# Patient Record
Sex: FEMALE | Race: BLACK OR AFRICAN AMERICAN
[De-identification: names, ages, dates, MRNs, and addresses within clinical notes are randomized per-mention and may not be internally consistent; named-entity substitution may affect disease eponyms.]

---

## 2019-06-24 ENCOUNTER — APPOINTMENT (OUTPATIENT)
Dept: INTERNAL MEDICINE | Facility: CLINIC | Age: 54
End: 2019-06-24
Payer: COMMERCIAL

## 2019-06-24 VITALS
OXYGEN SATURATION: 100 % | TEMPERATURE: 98.3 F | HEIGHT: 60 IN | WEIGHT: 147 LBS | HEART RATE: 52 BPM | BODY MASS INDEX: 28.86 KG/M2

## 2019-06-24 VITALS — DIASTOLIC BLOOD PRESSURE: 70 MMHG | SYSTOLIC BLOOD PRESSURE: 130 MMHG

## 2019-06-24 DIAGNOSIS — Z83.42 FAMILY HISTORY OF FAMILIAL HYPERCHOLESTEROLEMIA: ICD-10-CM

## 2019-06-24 DIAGNOSIS — Z82.49 FAMILY HISTORY OF ISCHEMIC HEART DISEASE AND OTHER DISEASES OF THE CIRCULATORY SYSTEM: ICD-10-CM

## 2019-06-24 DIAGNOSIS — H43.392 OTHER VITREOUS OPACITIES, LEFT EYE: ICD-10-CM

## 2019-06-24 DIAGNOSIS — Z87.898 PERSONAL HISTORY OF OTHER SPECIFIED CONDITIONS: ICD-10-CM

## 2019-06-24 DIAGNOSIS — Z83.3 FAMILY HISTORY OF DIABETES MELLITUS: ICD-10-CM

## 2019-06-24 DIAGNOSIS — Z78.9 OTHER SPECIFIED HEALTH STATUS: ICD-10-CM

## 2019-06-24 PROCEDURE — 36415 COLL VENOUS BLD VENIPUNCTURE: CPT

## 2019-06-24 PROCEDURE — 99386 PREV VISIT NEW AGE 40-64: CPT | Mod: 25

## 2019-06-24 PROCEDURE — 93000 ELECTROCARDIOGRAM COMPLETE: CPT

## 2019-06-24 NOTE — REVIEW OF SYSTEMS
[Negative] : Heme/Lymph [FreeTextEntry3] : has a floater left eye [FreeTextEntry5] : has palpitations stressed,  palps come and go [FreeTextEntry7] : did not colonoscopy/ constip occ [de-identified] : no headaches [de-identified] : stressed, sleeps well. sleepy, not crying [FreeTextEntry1] : LMP in FEB 2019,reg til then, feels as if might have a period, last mammogram 2 years ago./ had a fibroid and was anemic

## 2019-06-24 NOTE — HISTORY OF PRESENT ILLNESS
[FreeTextEntry1] : here as a new patient [de-identified] : has lost 5 lbs, working in a [employer] [facility]

## 2019-06-24 NOTE — ASSESSMENT
[FreeTextEntry1] : at risk for diabetes: watch weight, check labs\par floater: f/u opthal\par perimenoause\par palpitations : echo and halter\par

## 2019-06-24 NOTE — HEALTH RISK ASSESSMENT
[Good] : ~his/her~  mood as  good [Monthly or less (1 pt)] : Monthly or less (1 point) [1 or 2 (0 pts)] : 1 or 2 (0 points) [Never (0 pts)] : Never (0 points) [No falls in past year] : Patient reported no falls in the past year [0] : 2) Feeling down, depressed, or hopeless: Not at all (0) [Patient reported PAP Smear was normal] : Patient reported PAP Smear was normal [HIV test declined] : HIV test declined [Hepatitis C test offered] : Hepatitis C test offered [With Family] : lives with family [Employed] : employed [] :  [Feels Safe at Home] : Feels safe at home [Smoke Detector] : smoke detector [Carbon Monoxide Detector] : carbon monoxide detector [Seat Belt] :  uses seat belt [Sunscreen] : uses sunscreen [] : No [de-identified] : None [Audit-CScore] : 1 [de-identified] : Walking [[id number]] : 0 [Reports changes in hearing] : Reports no changes in hearing [Reports changes in vision] : Reports no changes in vision [Reports changes in dental health] : Reports no changes in dental health [MammogramDate] : 01/17 [PapSmearDate] : 01/17

## 2019-06-24 NOTE — PHYSICAL EXAM
[No Acute Distress] : no acute distress [Well Nourished] : well nourished [Well Developed] : well developed [Well-Appearing] : well-appearing [EOMI] : extraocular movements intact [PERRL] : pupils equal round and reactive to light [Normal Sclera/Conjunctiva] : normal sclera/conjunctiva [Normal Outer Ear/Nose] : the outer ears and nose were normal in appearance [Supple] : supple [Normal Oropharynx] : the oropharynx was normal [No JVD] : no jugular venous distention [No Respiratory Distress] : no respiratory distress  [Thyroid Normal, No Nodules] : the thyroid was normal and there were no nodules present [No Lymphadenopathy] : no lymphadenopathy [Normal Rate] : normal rate  [Clear to Auscultation] : lungs were clear to auscultation bilaterally [No Accessory Muscle Use] : no accessory muscle use [Regular Rhythm] : with a regular rhythm [Normal S1, S2] : normal S1 and S2 [No Abdominal Bruit] : a ~M bruit was not heard ~T in the abdomen [No Murmur] : no murmur heard [No Carotid Bruits] : no carotid bruits [Pedal Pulses Present] : the pedal pulses are present [No Varicosities] : no varicosities [No Edema] : there was no peripheral edema [No Extremity Clubbing/Cyanosis] : no extremity clubbing/cyanosis [Soft] : abdomen soft [Non Tender] : non-tender [No Palpable Aorta] : no palpable aorta [No Masses] : no abdominal mass palpated [Non-distended] : non-distended [No HSM] : no HSM [Normal Anterior Cervical Nodes] : no anterior cervical lymphadenopathy [Normal Posterior Cervical Nodes] : no posterior cervical lymphadenopathy [Normal Bowel Sounds] : normal bowel sounds [No CVA Tenderness] : no CVA  tenderness [No Spinal Tenderness] : no spinal tenderness [Grossly Normal Strength/Tone] : grossly normal strength/tone [No Joint Swelling] : no joint swelling [No Rash] : no rash [No Focal Deficits] : no focal deficits [Normal Gait] : normal gait [Coordination Grossly Intact] : coordination grossly intact [Normal Affect] : the affect was normal [Deep Tendon Reflexes (DTR)] : deep tendon reflexes were 2+ and symmetric [Normal Insight/Judgement] : insight and judgment were intact [de-identified] : breast no masses scar on right breast and 2 on left,  [de-identified] : fibroid noted on the left [FreeTextEntry1] : def [de-identified] : def

## 2019-07-19 ENCOUNTER — CLINICAL ADVICE (OUTPATIENT)
Age: 54
End: 2019-07-19

## 2019-10-25 ENCOUNTER — NON-APPOINTMENT (OUTPATIENT)
Age: 54
End: 2019-10-25

## 2019-10-25 ENCOUNTER — APPOINTMENT (OUTPATIENT)
Dept: CARDIOLOGY | Facility: CLINIC | Age: 54
End: 2019-10-25
Payer: COMMERCIAL

## 2019-10-25 VITALS — WEIGHT: 146 LBS | BODY MASS INDEX: 28.66 KG/M2 | HEIGHT: 60 IN

## 2019-10-25 VITALS — DIASTOLIC BLOOD PRESSURE: 60 MMHG | SYSTOLIC BLOOD PRESSURE: 130 MMHG

## 2019-10-25 DIAGNOSIS — Z78.9 OTHER SPECIFIED HEALTH STATUS: ICD-10-CM

## 2019-10-25 DIAGNOSIS — Z86.39 PERSONAL HISTORY OF OTHER ENDOCRINE, NUTRITIONAL AND METABOLIC DISEASE: ICD-10-CM

## 2019-10-25 DIAGNOSIS — D25.9 LEIOMYOMA OF UTERUS, UNSPECIFIED: ICD-10-CM

## 2019-10-25 PROCEDURE — 93000 ELECTROCARDIOGRAM COMPLETE: CPT

## 2019-10-25 PROCEDURE — 99204 OFFICE O/P NEW MOD 45 MIN: CPT

## 2019-10-25 NOTE — REASON FOR VISIT
[Initial Evaluation] : an initial evaluation of [Hyperlipidemia] : hyperlipidemia [Palpitations] : palpitations

## 2019-10-28 NOTE — HISTORY OF PRESENT ILLNESS
[FreeTextEntry1] : 53 year old  lady was referred by Dr. Tabor because of palpitation.\par \par She started having intermittent palpitations a few years ago. She was being followed by Dr. Tabor in [place].  After Dr. Tabor left that practice,  the patient could not find her new location and therefore did not see any doctor for the last 2 years.  The severity and nature of palpitation has not changed much during this time.  Because of her age she felt that it was time for her to see a cardiologist for the palpitation and hence sought a consult.\par \par She feels a racing of heartbeat for a few seconds.  It does not last long.  There are no associated symptoms.  She has no symptoms on physical exertion.  She is not on any medications for palpitation.\par \par She has history of hyperthyroidism in the past and took some medication for 4-5 years.  She has gradually tapered off that medication.  At that time she was losing weight.

## 2019-10-28 NOTE — PHYSICAL EXAM
[General Appearance - Well Developed] : well developed [Well Groomed] : well groomed [General Appearance - Well Nourished] : well nourished [Normal Appearance] : normal appearance [Normal Conjunctiva] : the conjunctiva exhibited no abnormalities [General Appearance - In No Acute Distress] : no acute distress [No Deformities] : no deformities [Normal Oral Mucosa] : normal oral mucosa [Eyelids - No Xanthelasma] : the eyelids demonstrated no xanthelasmas [No Oral Pallor] : no oral pallor [No Oral Cyanosis] : no oral cyanosis [Normal Jugular Venous V Waves Present] : normal jugular venous V waves present [Normal Jugular Venous A Waves Present] : normal jugular venous A waves present [No Jugular Venous Mcneil A Waves] : no jugular venous mcneil A waves [Heart Sounds] : normal S1 and S2 [Heart Rate And Rhythm] : heart rate and rhythm were normal [Murmurs] : no murmurs present [Respiration, Rhythm And Depth] : normal respiratory rhythm and effort [Exaggerated Use Of Accessory Muscles For Inspiration] : no accessory muscle use [Abdomen Soft] : soft [Auscultation Breath Sounds / Voice Sounds] : lungs were clear to auscultation bilaterally [Abdomen Tenderness] : non-tender [Abdomen Mass (___ Cm)] : no abdominal mass palpated [Abnormal Walk] : normal gait [Gait - Sufficient For Exercise Testing] : the gait was sufficient for exercise testing [Nail Clubbing] : no clubbing of the fingernails [Cyanosis, Localized] : no localized cyanosis [Petechial Hemorrhages (___cm)] : no petechial hemorrhages [Skin Color & Pigmentation] : normal skin color and pigmentation [No Venous Stasis] : no venous stasis [] : no rash [No Skin Ulcers] : no skin ulcer [Skin Lesions] : no skin lesions [No Xanthoma] : no  xanthoma was observed [Affect] : the affect was normal [Oriented To Time, Place, And Person] : oriented to person, place, and time [Mood] : the mood was normal [No Anxiety] : not feeling anxious [FreeTextEntry1] : minimal Thyroid enlargement

## 2019-10-28 NOTE — DISCUSSION/SUMMARY
[FreeTextEntry1] : I did not make any changes in her treatment.  However I set her up for an echocardiogram and telemetry monitor.  Based upon the results further recommendations will follow.  I have asked her to try and obtain copies of her history regarding hyperthyroidism from her prior endocrinologist.\par \par ADDENDUM: \par \par At the end of the examination casually she mentioned that sometimes she gets left-sided chest tightness which occurs randomly and may last up to 15 minutes.  She has not noticed any particular association with exertion etc.  It lasts for several minutes and goes away spontaneously.  There are no associated symptoms.  I told her to see if it is related to inspiration, any body movements or physical exertion.  I also told her to see if it is reproducible.  This information will be helpful.  I did not do anything about it at this time.  If it continues to occur or gets worse then may be a stress test might be in order.

## 2019-10-28 NOTE — ASSESSMENT
[FreeTextEntry1] : Physical exam is unremarkable.  She does have a mild diffuse goiter.  Palpitations seems to be fairly benign and does not occur every day.  On some occasions, after the palpitations, she feels "out of it" for a brief period.

## 2019-12-06 ENCOUNTER — APPOINTMENT (OUTPATIENT)
Dept: CARDIOLOGY | Facility: CLINIC | Age: 54
End: 2019-12-06
Payer: COMMERCIAL

## 2019-12-06 PROCEDURE — 93306 TTE W/DOPPLER COMPLETE: CPT

## 2019-12-29 ENCOUNTER — TRANSCRIPTION ENCOUNTER (OUTPATIENT)
Age: 54
End: 2019-12-29

## 2020-01-07 ENCOUNTER — APPOINTMENT (OUTPATIENT)
Dept: CARDIOLOGY | Facility: CLINIC | Age: 55
End: 2020-01-07
Payer: COMMERCIAL

## 2020-01-07 VITALS — SYSTOLIC BLOOD PRESSURE: 126 MMHG | DIASTOLIC BLOOD PRESSURE: 80 MMHG

## 2020-01-07 VITALS — OXYGEN SATURATION: 98 % | WEIGHT: 143 LBS | HEART RATE: 76 BPM | BODY MASS INDEX: 27.93 KG/M2

## 2020-01-07 DIAGNOSIS — R92.8 OTHER ABNORMAL AND INCONCLUSIVE FINDINGS ON DIAGNOSTIC IMAGING OF BREAST: ICD-10-CM

## 2020-01-07 PROCEDURE — 99214 OFFICE O/P EST MOD 30 MIN: CPT

## 2020-01-07 NOTE — HISTORY OF PRESENT ILLNESS
[FreeTextEntry1] : She continues to get intermittent palpitations.  After the telemetry monitor and echo were done attempts were made to contact her.  However it was not possible because the telephone numbers listed were wrong numbers.  Messages were left but the person never called back.\par \par Telemetry monitor showed sinus rhythm with occasional VPCs, sinus bradycardia,Nonsustained PAT, sinus tachycardia,\par \par Echocardiogram showed normal left and right ventricular size and systolic function with trace of mitral and mild tricuspid regurgitation.

## 2020-01-07 NOTE — PHYSICAL EXAM
[General Appearance - Well Developed] : well developed [Normal Appearance] : normal appearance [Well Groomed] : well groomed [General Appearance - Well Nourished] : well nourished [No Deformities] : no deformities [General Appearance - In No Acute Distress] : no acute distress [Normal Conjunctiva] : the conjunctiva exhibited no abnormalities [Eyelids - No Xanthelasma] : the eyelids demonstrated no xanthelasmas [Normal Oral Mucosa] : normal oral mucosa [No Oral Pallor] : no oral pallor [No Oral Cyanosis] : no oral cyanosis [Normal Jugular Venous A Waves Present] : normal jugular venous A waves present [Normal Jugular Venous V Waves Present] : normal jugular venous V waves present [No Jugular Venous Mcneil A Waves] : no jugular venous mcneil A waves [FreeTextEntry1] : minimal Thyroid enlargement [Respiration, Rhythm And Depth] : normal respiratory rhythm and effort [Auscultation Breath Sounds / Voice Sounds] : lungs were clear to auscultation bilaterally [Exaggerated Use Of Accessory Muscles For Inspiration] : no accessory muscle use [Heart Rate And Rhythm] : heart rate and rhythm were normal [Heart Sounds] : normal S1 and S2 [Murmurs] : no murmurs present [Abdomen Soft] : soft [Abdomen Tenderness] : non-tender [Abdomen Mass (___ Cm)] : no abdominal mass palpated [Abnormal Walk] : normal gait [Gait - Sufficient For Exercise Testing] : the gait was sufficient for exercise testing [Nail Clubbing] : no clubbing of the fingernails [Cyanosis, Localized] : no localized cyanosis [Petechial Hemorrhages (___cm)] : no petechial hemorrhages [Skin Color & Pigmentation] : normal skin color and pigmentation [] : no rash [No Venous Stasis] : no venous stasis [Skin Lesions] : no skin lesions [No Skin Ulcers] : no skin ulcer [No Xanthoma] : no  xanthoma was observed [Affect] : the affect was normal [Oriented To Time, Place, And Person] : oriented to person, place, and time [Mood] : the mood was normal [No Anxiety] : not feeling anxious

## 2020-01-07 NOTE — DISCUSSION/SUMMARY
[FreeTextEntry1] : I have ordered metoprolol succinate 25 mg daily and based upon the response further recommendations will follow.

## 2020-02-12 ENCOUNTER — APPOINTMENT (OUTPATIENT)
Dept: CARDIOLOGY | Facility: CLINIC | Age: 55
End: 2020-02-12
Payer: COMMERCIAL

## 2020-02-12 ENCOUNTER — NON-APPOINTMENT (OUTPATIENT)
Age: 55
End: 2020-02-12

## 2020-02-12 VITALS — HEART RATE: 75 BPM | BODY MASS INDEX: 27.93 KG/M2 | WEIGHT: 143 LBS | OXYGEN SATURATION: 99 %

## 2020-02-12 VITALS — DIASTOLIC BLOOD PRESSURE: 80 MMHG | SYSTOLIC BLOOD PRESSURE: 120 MMHG

## 2020-02-12 PROCEDURE — 99214 OFFICE O/P EST MOD 30 MIN: CPT | Mod: 25

## 2020-02-12 PROCEDURE — 93000 ELECTROCARDIOGRAM COMPLETE: CPT

## 2020-02-12 NOTE — HISTORY OF PRESENT ILLNESS
[FreeTextEntry1] : She denies any chest pain or dizziness.  Palpitation is substantially decreased on the medication.  She gets worried sometimes if her weight goes to 60.  I reassured her that it is perfectly fine.

## 2020-02-12 NOTE — PHYSICAL EXAM
[General Appearance - Well Developed] : well developed [Normal Appearance] : normal appearance [Well Groomed] : well groomed [General Appearance - Well Nourished] : well nourished [No Deformities] : no deformities [General Appearance - In No Acute Distress] : no acute distress [Normal Conjunctiva] : the conjunctiva exhibited no abnormalities [No Oral Pallor] : no oral pallor [Normal Oral Mucosa] : normal oral mucosa [Eyelids - No Xanthelasma] : the eyelids demonstrated no xanthelasmas [No Oral Cyanosis] : no oral cyanosis [Normal Jugular Venous A Waves Present] : normal jugular venous A waves present [FreeTextEntry1] : minimal Thyroid enlargement [Normal Jugular Venous V Waves Present] : normal jugular venous V waves present [No Jugular Venous Mcneil A Waves] : no jugular venous mcneil A waves [Exaggerated Use Of Accessory Muscles For Inspiration] : no accessory muscle use [Respiration, Rhythm And Depth] : normal respiratory rhythm and effort [Heart Rate And Rhythm] : heart rate and rhythm were normal [Auscultation Breath Sounds / Voice Sounds] : lungs were clear to auscultation bilaterally [Heart Sounds] : normal S1 and S2 [Murmurs] : no murmurs present [Abdomen Soft] : soft [Abdomen Tenderness] : non-tender [Abdomen Mass (___ Cm)] : no abdominal mass palpated [Abnormal Walk] : normal gait [Gait - Sufficient For Exercise Testing] : the gait was sufficient for exercise testing [Nail Clubbing] : no clubbing of the fingernails [Petechial Hemorrhages (___cm)] : no petechial hemorrhages [Cyanosis, Localized] : no localized cyanosis [Skin Color & Pigmentation] : normal skin color and pigmentation [No Venous Stasis] : no venous stasis [] : no rash [Skin Lesions] : no skin lesions [No Skin Ulcers] : no skin ulcer [Oriented To Time, Place, And Person] : oriented to person, place, and time [No Xanthoma] : no  xanthoma was observed [No Anxiety] : not feeling anxious [Mood] : the mood was normal [Affect] : the affect was normal

## 2020-02-12 NOTE — DISCUSSION/SUMMARY
[FreeTextEntry1] : I reassured her about her condition and told her to continue taking metoprolol which was renewed.

## 2020-02-12 NOTE — ASSESSMENT
[FreeTextEntry1] : She is doing well from cardiac point of view I reassured her about her condition and the slowing of heart rate which is due to rest as well as beta-blocker.

## 2020-04-06 ENCOUNTER — APPOINTMENT (OUTPATIENT)
Dept: INTERNAL MEDICINE | Facility: CLINIC | Age: 55
End: 2020-04-06
Payer: COMMERCIAL

## 2020-04-06 PROCEDURE — G2012 BRIEF CHECK IN BY MD/QHP: CPT

## 2020-04-06 NOTE — HISTORY OF PRESENT ILLNESS
[Verbal consent obtained from patient] : the patient, [unfilled] [FreeTextEntry1] : F/U on COVID re symptoms [Time Spent: ___ minutes] : I have spent [unfilled] minutes with the patient on the telephone

## 2020-06-16 ENCOUNTER — APPOINTMENT (OUTPATIENT)
Dept: CARDIOLOGY | Facility: CLINIC | Age: 55
End: 2020-06-16
Payer: COMMERCIAL

## 2020-06-16 ENCOUNTER — NON-APPOINTMENT (OUTPATIENT)
Age: 55
End: 2020-06-16

## 2020-06-16 VITALS — WEIGHT: 141 LBS | BODY MASS INDEX: 27.54 KG/M2 | OXYGEN SATURATION: 98 % | HEART RATE: 67 BPM

## 2020-06-16 VITALS — DIASTOLIC BLOOD PRESSURE: 60 MMHG | SYSTOLIC BLOOD PRESSURE: 126 MMHG

## 2020-06-16 PROCEDURE — 93000 ELECTROCARDIOGRAM COMPLETE: CPT

## 2020-06-16 PROCEDURE — 99214 OFFICE O/P EST MOD 30 MIN: CPT

## 2020-06-16 NOTE — HISTORY OF PRESENT ILLNESS
[FreeTextEntry1] : She still gets palpitation\par \par Once or twice a week since she had cold infection at the end of March and through April.  She had fever for 2 weeks.  She had aches and pains cough and loss of sense of smell and taste.  She lost weight.  She is now COVID antibody positive.  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  During the time when she was sick with cold, the palpitations and increase in some occasions she took 2 pills of metoprolol.

## 2020-06-16 NOTE — PHYSICAL EXAM
[General Appearance - Well Developed] : well developed [Normal Appearance] : normal appearance [General Appearance - Well Nourished] : well nourished [No Deformities] : no deformities [Well Groomed] : well groomed [General Appearance - In No Acute Distress] : no acute distress [Normal Conjunctiva] : the conjunctiva exhibited no abnormalities [Eyelids - No Xanthelasma] : the eyelids demonstrated no xanthelasmas [Normal Oral Mucosa] : normal oral mucosa [No Oral Pallor] : no oral pallor [No Oral Cyanosis] : no oral cyanosis [Normal Jugular Venous V Waves Present] : normal jugular venous V waves present [Normal Jugular Venous A Waves Present] : normal jugular venous A waves present [FreeTextEntry1] : minimal Thyroid enlargement [No Jugular Venous Mcneil A Waves] : no jugular venous mcneil A waves [Exaggerated Use Of Accessory Muscles For Inspiration] : no accessory muscle use [Respiration, Rhythm And Depth] : normal respiratory rhythm and effort [Heart Sounds] : normal S1 and S2 [Auscultation Breath Sounds / Voice Sounds] : lungs were clear to auscultation bilaterally [Heart Rate And Rhythm] : heart rate and rhythm were normal [Murmurs] : no murmurs present [Abdomen Soft] : soft [Abdomen Tenderness] : non-tender [Abnormal Walk] : normal gait [Abdomen Mass (___ Cm)] : no abdominal mass palpated [Gait - Sufficient For Exercise Testing] : the gait was sufficient for exercise testing [Nail Clubbing] : no clubbing of the fingernails [Petechial Hemorrhages (___cm)] : no petechial hemorrhages [Cyanosis, Localized] : no localized cyanosis [Skin Color & Pigmentation] : normal skin color and pigmentation [] : no rash [No Venous Stasis] : no venous stasis [No Skin Ulcers] : no skin ulcer [Skin Lesions] : no skin lesions [No Xanthoma] : no  xanthoma was observed [Oriented To Time, Place, And Person] : oriented to person, place, and time [Affect] : the affect was normal [Mood] : the mood was normal [No Anxiety] : not feeling anxious

## 2020-06-16 NOTE — REASON FOR VISIT
[Follow-Up - Clinic] : a clinic follow-up of [Palpitations] : palpitations [Hyperlipidemia] : hyperlipidemia

## 2020-06-16 NOTE — DISCUSSION/SUMMARY
[FreeTextEntry1] : I did not make any changes in her medication but told her to discontinue aspirin.  He had complete blood work done at an urgent care center about a month ago.  I will try and get copies of the same.

## 2020-06-16 NOTE — ASSESSMENT
[FreeTextEntry1] : She is doing well and has recovered from COVID infection.  Palpitation is infrequent.  Occasionally her blood pressure went down to 94/54.  She was asymptomatic when that happens.  He had started taking aspirin while she was sick with cold.

## 2020-09-21 ENCOUNTER — APPOINTMENT (OUTPATIENT)
Dept: CARDIOLOGY | Facility: CLINIC | Age: 55
End: 2020-09-21
Payer: COMMERCIAL

## 2020-09-21 VITALS — WEIGHT: 139 LBS | HEART RATE: 74 BPM | OXYGEN SATURATION: 99 % | BODY MASS INDEX: 27.15 KG/M2

## 2020-09-21 VITALS — SYSTOLIC BLOOD PRESSURE: 144 MMHG | DIASTOLIC BLOOD PRESSURE: 80 MMHG

## 2020-09-21 DIAGNOSIS — E04.9 NONTOXIC GOITER, UNSPECIFIED: ICD-10-CM

## 2020-09-21 PROCEDURE — 99214 OFFICE O/P EST MOD 30 MIN: CPT

## 2020-09-21 RX ORDER — ASPIRIN 81 MG/1
81 TABLET, CHEWABLE ORAL DAILY
Qty: 90 | Refills: 0 | Status: DISCONTINUED | COMMUNITY
Start: 2019-10-25 | End: 2020-09-21

## 2020-09-21 NOTE — HISTORY OF PRESENT ILLNESS
[FreeTextEntry1] : She states while coming down to the office she was feeling very short of breath.  Sometimes her resting rate goes in the mid 50s and she gets scared.  She states that she checks her blood pressure manually and sometimes she gets 90/50.  Apparently it has been low sometimes after COVID.  Heart palpitations  has more or less disappeared on the medication she had. She had Covid infection at the end of March and early April.

## 2020-09-21 NOTE — DISCUSSION/SUMMARY
[FreeTextEntry1] : Presently she is doing well.  I did not make any changes.  I told her to maintain a log of her blood pressures and bring them to me.  I will see her in 3 months.\par \par She refused PPD at work and therefore she is required to submit a chest x-ray.  I ordered x-ray.

## 2020-09-21 NOTE — PHYSICAL EXAM
[General Appearance - Well Developed] : well developed [Normal Appearance] : normal appearance [Well Groomed] : well groomed [General Appearance - Well Nourished] : well nourished [No Deformities] : no deformities [General Appearance - In No Acute Distress] : no acute distress [Normal Conjunctiva] : the conjunctiva exhibited no abnormalities [Eyelids - No Xanthelasma] : the eyelids demonstrated no xanthelasmas [Normal Oral Mucosa] : normal oral mucosa [No Oral Pallor] : no oral pallor [No Oral Cyanosis] : no oral cyanosis [Normal Jugular Venous A Waves Present] : normal jugular venous A waves present [Normal Jugular Venous V Waves Present] : normal jugular venous V waves present [No Jugular Venous Mcneil A Waves] : no jugular venous mcneil A waves [Respiration, Rhythm And Depth] : normal respiratory rhythm and effort [Exaggerated Use Of Accessory Muscles For Inspiration] : no accessory muscle use [Auscultation Breath Sounds / Voice Sounds] : lungs were clear to auscultation bilaterally [Heart Rate And Rhythm] : heart rate and rhythm were normal [Heart Sounds] : normal S1 and S2 [Murmurs] : no murmurs present [Abdomen Soft] : soft [Abdomen Tenderness] : non-tender [Abdomen Mass (___ Cm)] : no abdominal mass palpated [Abnormal Walk] : normal gait [Gait - Sufficient For Exercise Testing] : the gait was sufficient for exercise testing [Nail Clubbing] : no clubbing of the fingernails [Cyanosis, Localized] : no localized cyanosis [Petechial Hemorrhages (___cm)] : no petechial hemorrhages [Skin Color & Pigmentation] : normal skin color and pigmentation [] : no rash [No Venous Stasis] : no venous stasis [Skin Lesions] : no skin lesions [No Skin Ulcers] : no skin ulcer [No Xanthoma] : no  xanthoma was observed [Oriented To Time, Place, And Person] : oriented to person, place, and time [Affect] : the affect was normal [Mood] : the mood was normal [No Anxiety] : not feeling anxious [FreeTextEntry1] : minimal Thyroid enlargement

## 2020-09-21 NOTE — ASSESSMENT
[FreeTextEntry1] : Cardiac wise she is stable.  She is very anxious and nervous and to some extent her symptoms may be from that.  She is afraid to go and walk because of the pandemic.

## 2020-12-21 ENCOUNTER — APPOINTMENT (OUTPATIENT)
Dept: CARDIOLOGY | Facility: CLINIC | Age: 55
End: 2020-12-21
Payer: COMMERCIAL

## 2020-12-21 ENCOUNTER — NON-APPOINTMENT (OUTPATIENT)
Age: 55
End: 2020-12-21

## 2020-12-21 VITALS — WEIGHT: 139 LBS | BODY MASS INDEX: 27.29 KG/M2 | HEIGHT: 60 IN | HEART RATE: 48 BPM | OXYGEN SATURATION: 98 %

## 2020-12-21 VITALS — DIASTOLIC BLOOD PRESSURE: 70 MMHG | SYSTOLIC BLOOD PRESSURE: 110 MMHG

## 2020-12-21 DIAGNOSIS — U07.1 COVID-19: ICD-10-CM

## 2020-12-21 PROCEDURE — 99214 OFFICE O/P EST MOD 30 MIN: CPT

## 2020-12-21 PROCEDURE — 99072 ADDL SUPL MATRL&STAF TM PHE: CPT

## 2020-12-21 PROCEDURE — 93000 ELECTROCARDIOGRAM COMPLETE: CPT

## 2020-12-21 NOTE — PHYSICAL EXAM
[General Appearance - Well Developed] : well developed [Normal Appearance] : normal appearance [Well Groomed] : well groomed [General Appearance - Well Nourished] : well nourished [No Deformities] : no deformities [General Appearance - In No Acute Distress] : no acute distress [Normal Conjunctiva] : the conjunctiva exhibited no abnormalities [Eyelids - No Xanthelasma] : the eyelids demonstrated no xanthelasmas [Normal Oral Mucosa] : normal oral mucosa [No Oral Pallor] : no oral pallor [No Oral Cyanosis] : no oral cyanosis [Normal Jugular Venous A Waves Present] : normal jugular venous A waves present [Normal Jugular Venous V Waves Present] : normal jugular venous V waves present [No Jugular Venous Mcneil A Waves] : no jugular venous mcneil A waves [FreeTextEntry1] : minimal Thyroid enlargement [Respiration, Rhythm And Depth] : normal respiratory rhythm and effort [Exaggerated Use Of Accessory Muscles For Inspiration] : no accessory muscle use [Auscultation Breath Sounds / Voice Sounds] : lungs were clear to auscultation bilaterally [Heart Rate And Rhythm] : heart rate and rhythm were normal [Heart Sounds] : normal S1 and S2 [Murmurs] : no murmurs present [Abdomen Soft] : soft [Abdomen Tenderness] : non-tender [Abdomen Mass (___ Cm)] : no abdominal mass palpated [Abnormal Walk] : normal gait [Gait - Sufficient For Exercise Testing] : the gait was sufficient for exercise testing [Nail Clubbing] : no clubbing of the fingernails [Cyanosis, Localized] : no localized cyanosis [Petechial Hemorrhages (___cm)] : no petechial hemorrhages [Skin Color & Pigmentation] : normal skin color and pigmentation [] : no rash [No Venous Stasis] : no venous stasis [Skin Lesions] : no skin lesions [No Skin Ulcers] : no skin ulcer [No Xanthoma] : no  xanthoma was observed [Oriented To Time, Place, And Person] : oriented to person, place, and time [Affect] : the affect was normal [Mood] : the mood was normal [No Anxiety] : not feeling anxious

## 2020-12-21 NOTE — ASSESSMENT
[FreeTextEntry1] : She is doing well from cardiac point of view.  There are no symptoms of ACS or fluid overload.  There is isolated palpitation without any other symptoms.

## 2020-12-21 NOTE — REASON FOR VISIT
[Follow-Up - Clinic] : a clinic follow-up of [Chest Pain] : chest pain [Dizziness] : dizziness [Hyperlipidemia] : hyperlipidemia [Palpitations] : palpitations

## 2020-12-21 NOTE — HISTORY OF PRESENT ILLNESS
[FreeTextEntry1] : She denies any chest pain or shortness of breath or dizziness.  Once in a while she feels a skipped beat.  It is infrequent.

## 2020-12-21 NOTE — DISCUSSION/SUMMARY
[FreeTextEntry1] : As she is doing well, I did not make any change in her treatment.  She was concerned about the intermittent palpitation.  He had 1 skipped beat while she was talking to me.  To ensure that there was no residual cardiomyopathy from her recent Covid infection, I ordered an echocardiogram.

## 2021-03-17 ENCOUNTER — RX RENEWAL (OUTPATIENT)
Age: 56
End: 2021-03-17

## 2021-03-22 ENCOUNTER — APPOINTMENT (OUTPATIENT)
Dept: CARDIOLOGY | Facility: CLINIC | Age: 56
End: 2021-03-22
Payer: COMMERCIAL

## 2021-03-22 ENCOUNTER — NON-APPOINTMENT (OUTPATIENT)
Age: 56
End: 2021-03-22

## 2021-03-22 VITALS — DIASTOLIC BLOOD PRESSURE: 60 MMHG | SYSTOLIC BLOOD PRESSURE: 126 MMHG

## 2021-03-22 VITALS — HEART RATE: 54 BPM | OXYGEN SATURATION: 98 % | WEIGHT: 133 LBS | BODY MASS INDEX: 25.98 KG/M2

## 2021-03-22 PROCEDURE — 99213 OFFICE O/P EST LOW 20 MIN: CPT

## 2021-03-22 PROCEDURE — 99072 ADDL SUPL MATRL&STAF TM PHE: CPT

## 2021-03-22 PROCEDURE — 93000 ELECTROCARDIOGRAM COMPLETE: CPT

## 2021-03-22 NOTE — DISCUSSION/SUMMARY
[FreeTextEntry1] : Because of the increasing palpitation/fluttering and history of PSVT in the past, and increased metoprolol on a trial basis to 37.5 mg once a day.  I told her to check her heart rate in couple of days and call me with the resting heart rate.

## 2021-03-22 NOTE — HISTORY OF PRESENT ILLNESS
[FreeTextEntry1] : He continues to feel intermittent palpitation lasting seconds.  It feels like fluttering.  It may occur 2-3 times a week.  There are no associated symptoms.  There is no chest pressure.

## 2021-03-22 NOTE — ASSESSMENT
[FreeTextEntry1] : Recently her father  in [place].  Since then the palpitation is slightly increased.  There are no hemodynamic symptoms associated.  She is on metoprolol 25 mg daily and her resting heart rate is 54/min.

## 2021-04-26 ENCOUNTER — APPOINTMENT (OUTPATIENT)
Dept: CARDIOLOGY | Facility: CLINIC | Age: 56
End: 2021-04-26
Payer: COMMERCIAL

## 2021-04-26 VITALS — DIASTOLIC BLOOD PRESSURE: 60 MMHG | SYSTOLIC BLOOD PRESSURE: 124 MMHG

## 2021-04-26 VITALS — OXYGEN SATURATION: 98 % | WEIGHT: 133 LBS | HEART RATE: 55 BPM | BODY MASS INDEX: 25.98 KG/M2

## 2021-04-26 DIAGNOSIS — R07.89 OTHER CHEST PAIN: ICD-10-CM

## 2021-04-26 PROCEDURE — 99214 OFFICE O/P EST MOD 30 MIN: CPT

## 2021-04-26 PROCEDURE — 99072 ADDL SUPL MATRL&STAF TM PHE: CPT

## 2021-05-07 ENCOUNTER — APPOINTMENT (OUTPATIENT)
Dept: CARDIOLOGY | Facility: CLINIC | Age: 56
End: 2021-05-07
Payer: COMMERCIAL

## 2021-05-07 PROCEDURE — 99072 ADDL SUPL MATRL&STAF TM PHE: CPT

## 2021-05-07 PROCEDURE — 93015 CV STRESS TEST SUPVJ I&R: CPT

## 2021-05-17 PROBLEM — R07.89 CHEST DISCOMFORT: Status: ACTIVE | Noted: 2019-10-25

## 2021-05-17 NOTE — DISCUSSION/SUMMARY
[FreeTextEntry1] : On a trial basis I told her to increase metoprolol to 50 mg daily.  I will set of simple treadmill exercise stress test to see the effect of exercise on her arrhythmia and palpitations.

## 2021-05-17 NOTE — HISTORY OF PRESENT ILLNESS
[FreeTextEntry1] : She continues to feel the palpitation.  37.5 mg of metoprolol has not made any difference.  She stated sometimes her pulse goes in the 50s.

## 2021-06-07 ENCOUNTER — APPOINTMENT (OUTPATIENT)
Dept: CARDIOLOGY | Facility: CLINIC | Age: 56
End: 2021-06-07
Payer: COMMERCIAL

## 2021-06-07 VITALS — WEIGHT: 132 LBS | BODY MASS INDEX: 25.78 KG/M2 | HEART RATE: 59 BPM | OXYGEN SATURATION: 98 %

## 2021-06-07 VITALS — SYSTOLIC BLOOD PRESSURE: 120 MMHG | DIASTOLIC BLOOD PRESSURE: 60 MMHG

## 2021-06-07 PROCEDURE — 99072 ADDL SUPL MATRL&STAF TM PHE: CPT

## 2021-06-07 PROCEDURE — 99214 OFFICE O/P EST MOD 30 MIN: CPT

## 2021-06-07 NOTE — ASSESSMENT
[FreeTextEntry1] : Her palpitation is better but not completely resolved.  Based on stress test, probability of significant coronary disease is low.  Exercise capacity was fairly good.

## 2021-06-07 NOTE — HISTORY OF PRESENT ILLNESS
[FreeTextEntry1] : She feels better but she still continues to get fluttering the chest daily.  It is not as bad as it used to be before.  Also sometimes gets a little woozy.  After the palpitation for a short time she did not feel her own self.\par \par She had a stress test done on 7 May.  Was submaximal, symptom limited exercise stress test without any exercise-induced chest pain, diagnostic ST change or significant arrhythmia at a workload of 91%.  There were occasional APCs and VPCs during exercise.  There was no worsening of baseline arrhythmia during or following exercise.  There were no episodes of PSVT.  The test was done on metoprolol.

## 2021-06-07 NOTE — DISCUSSION/SUMMARY
[FreeTextEntry1] : On a trial basis I told her to try and increase the dose of metoprolol to 75 mg daily.  If she tolerates that and if she feels better as far as palpitation is concerned I will continue the same dose.  If she does not tolerate then will go back to 50 mg.

## 2021-06-14 ENCOUNTER — RX RENEWAL (OUTPATIENT)
Age: 56
End: 2021-06-14

## 2021-07-02 ENCOUNTER — APPOINTMENT (OUTPATIENT)
Dept: FAMILY MEDICINE | Facility: CLINIC | Age: 56
End: 2021-07-02
Payer: COMMERCIAL

## 2021-07-02 VITALS
OXYGEN SATURATION: 99 % | TEMPERATURE: 97.9 F | HEART RATE: 58 BPM | DIASTOLIC BLOOD PRESSURE: 76 MMHG | HEIGHT: 60 IN | BODY MASS INDEX: 27.09 KG/M2 | WEIGHT: 138 LBS | SYSTOLIC BLOOD PRESSURE: 132 MMHG

## 2021-07-02 DIAGNOSIS — R41.3 OTHER AMNESIA: ICD-10-CM

## 2021-07-02 DIAGNOSIS — R53.83 OTHER FATIGUE: ICD-10-CM

## 2021-07-02 DIAGNOSIS — Z11.59 ENCOUNTER FOR SCREENING FOR OTHER VIRAL DISEASES: ICD-10-CM

## 2021-07-02 DIAGNOSIS — Z84.1 FAMILY HISTORY OF DISORDERS OF KIDNEY AND URETER: ICD-10-CM

## 2021-07-02 PROCEDURE — 36415 COLL VENOUS BLD VENIPUNCTURE: CPT

## 2021-07-02 PROCEDURE — 99072 ADDL SUPL MATRL&STAF TM PHE: CPT

## 2021-07-02 PROCEDURE — 99396 PREV VISIT EST AGE 40-64: CPT | Mod: 25

## 2021-07-02 NOTE — HEALTH RISK ASSESSMENT
[Yes] : Yes [Monthly or less (1 pt)] : Monthly or less (1 point) [Patient reported mammogram was normal] : Patient reported mammogram was normal [Patient reported PAP Smear was normal] : Patient reported PAP Smear was normal [With Significant Other] : lives with significant other [# of Members in Household ___] :  household currently consist of [unfilled] member(s) [Employed] : employed [] :  [# Of Children ___] : has [unfilled] children [HIV test declined] : HIV test declined [Hepatitis C test offered] : Hepatitis C test offered [] : No [de-identified] : Cardio, Gyn [de-identified] : No red meat [de-identified] : Not formally [MammogramDate] : 12/20 [MammogramComments] : Lumpectomy and Cystic breasts [PapSmearDate] : 2021 [ColonoscopyComments] : Willing to go for colonoscopy  [de-identified] :  and daughter [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

## 2021-07-02 NOTE — PLAN
[FreeTextEntry1] : Will follow up labwork drawn in office today.  Stable exam.\par \par HM-up to date mammogram, pap, needs colonoscopy, has never had.  Will work on making the appointment. \par \par Check Hep C. \par Nausea/Quesiness-possible GERD; advised to keep food journal. \par \par Palpitations-following with Cardio; check TSH and CBC. \par Focus/Memory-possibly related to fatigue, work stress, recent loss of parent.  Asked to try and regulate and increase sleep.  Advised if ongoing will need Neurology eval.\par \par Will adjust meds based on labs. \par Patient expressed understanding of plan.\par

## 2021-07-02 NOTE — PHYSICAL EXAM
[Normal Oropharynx] : the oropharynx was normal [No Lymphadenopathy] : no lymphadenopathy [Supple] : supple [No Extremity Clubbing/Cyanosis] : no extremity clubbing/cyanosis [No Edema] : there was no peripheral edema [Normal] : affect was normal and insight and judgment were intact [de-identified] : sania mock/l [de-identified] : CN II-XII intact

## 2021-07-02 NOTE — REVIEW OF SYSTEMS
[Palpitations] : palpitations [Constipation] : constipation [Negative] : Genitourinary [Chest Pain] : no chest pain [Diarrhea] : diarrhea [Vomiting] : no vomiting [Heartburn] : no heartburn [Headache] : no headache [Dizziness] : no dizziness [FreeTextEntry7] : Nausea 1x a month;  [de-identified] : focus feels off [de-identified] : 5-6 hours of sleep per night; napping 1-2 hours some nights

## 2021-07-02 NOTE — HISTORY OF PRESENT ILLNESS
[FreeTextEntry1] : Ms. VALLE presents for annual physical.\par  [de-identified] : Ms. VALLE presents for annual physical.  Hx of hyperthyroidism, hx of anemia when having menses. \par Transitioning from Dr. Tabor. \par Follows with Cardiology regularly for palpitations.\par Takes Vitamin D, Tumeric, 75mg of metoprolol.  Just increased by Cardiology. \par \par Saw GYN this year and had pap smear. \par 2 children\par Had COVID March 2020\par Finished COVID Vaccine-April 2021 \par \par Dad passed in March, kidney failure.  Patient feels her memory and focus has been off.  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  Only averages 5 hours of sleep per night.  Feels tired. \par Wants to check Hep C-had accident at work many months ago. \par Medications and allergies reviewed.\par

## 2021-07-06 LAB
25(OH)D3 SERPL-MCNC: 19.7 NG/ML
ALBUMIN SERPL ELPH-MCNC: 4.6 G/DL
ALP BLD-CCNC: 79 U/L
ALT SERPL-CCNC: 29 U/L
ANION GAP SERPL CALC-SCNC: 14 MMOL/L
AST SERPL-CCNC: 23 U/L
BASOPHILS # BLD AUTO: 0.04 K/UL
BASOPHILS NFR BLD AUTO: 1 %
BILIRUB SERPL-MCNC: 0.3 MG/DL
BUN SERPL-MCNC: 12 MG/DL
CALCIUM SERPL-MCNC: 10.6 MG/DL
CHLORIDE SERPL-SCNC: 102 MMOL/L
CHOLEST SERPL-MCNC: 237 MG/DL
CO2 SERPL-SCNC: 26 MMOL/L
CREAT SERPL-MCNC: 0.75 MG/DL
EOSINOPHIL # BLD AUTO: 0.04 K/UL
EOSINOPHIL NFR BLD AUTO: 1 %
ESTIMATED AVERAGE GLUCOSE: 103 MG/DL
FOLATE SERPL-MCNC: 11.3 NG/ML
GLUCOSE SERPL-MCNC: 75 MG/DL
HBA1C MFR BLD HPLC: 5.2 %
HCT VFR BLD CALC: 37 %
HCV AB SER QL: NONREACTIVE
HCV S/CO RATIO: 0.13 S/CO
HDLC SERPL-MCNC: 57 MG/DL
HGB BLD-MCNC: 11.5 G/DL
IMM GRANULOCYTES NFR BLD AUTO: 0 %
LDLC SERPL CALC-MCNC: 163 MG/DL
LYMPHOCYTES # BLD AUTO: 1.71 K/UL
LYMPHOCYTES NFR BLD AUTO: 43.3 %
MAN DIFF?: NORMAL
MCHC RBC-ENTMCNC: 29.3 PG
MCHC RBC-ENTMCNC: 31.1 GM/DL
MCV RBC AUTO: 94.4 FL
MONOCYTES # BLD AUTO: 0.37 K/UL
MONOCYTES NFR BLD AUTO: 9.4 %
NEUTROPHILS # BLD AUTO: 1.79 K/UL
NEUTROPHILS NFR BLD AUTO: 45.3 %
NONHDLC SERPL-MCNC: 180 MG/DL
PLATELET # BLD AUTO: 191 K/UL
POTASSIUM SERPL-SCNC: 4.6 MMOL/L
PROT SERPL-MCNC: 8.3 G/DL
RBC # BLD: 3.92 M/UL
RBC # FLD: 14.2 %
SODIUM SERPL-SCNC: 142 MMOL/L
TRIGL SERPL-MCNC: 83 MG/DL
TSH SERPL-ACNC: 1.36 UIU/ML
VIT B12 SERPL-MCNC: 740 PG/ML
WBC # FLD AUTO: 3.95 K/UL

## 2021-09-13 ENCOUNTER — RESULT CHARGE (OUTPATIENT)
Age: 56
End: 2021-09-13

## 2021-09-13 ENCOUNTER — APPOINTMENT (OUTPATIENT)
Dept: CARDIOLOGY | Facility: CLINIC | Age: 56
End: 2021-09-13
Payer: COMMERCIAL

## 2021-09-13 VITALS — WEIGHT: 135 LBS | HEART RATE: 53 BPM | BODY MASS INDEX: 26.37 KG/M2 | OXYGEN SATURATION: 98 %

## 2021-09-13 VITALS — DIASTOLIC BLOOD PRESSURE: 60 MMHG | SYSTOLIC BLOOD PRESSURE: 120 MMHG

## 2021-09-13 DIAGNOSIS — R42 DIZZINESS AND GIDDINESS: ICD-10-CM

## 2021-09-13 PROCEDURE — 99214 OFFICE O/P EST MOD 30 MIN: CPT

## 2021-09-26 NOTE — DISCUSSION/SUMMARY
[FreeTextEntry1] : At this time she is hemodynamically stable.  I did not make any changes in her medications. She was reassured about her condition.\par \par She thinks she may be getting some aches and pains from low-dose Lipitor.  She will go to follow-up with PCP.  She can try Crestor.

## 2021-09-26 NOTE — ASSESSMENT
[FreeTextEntry1] : Overall condition is satisfactory.  Arrhythmia is under good control. (I am not sure about the accuracy of the reading)

## 2021-09-26 NOTE — HISTORY OF PRESENT ILLNESS
[FreeTextEntry1] : She states that her overall condition is the same.  She continues to get the palpitation.  There is no more chest pain.  Sometimes she notices her blood pressure going down to 90/50 and pulse of 50 in which case she does not take the metoprolol.

## 2021-10-13 ENCOUNTER — RX RENEWAL (OUTPATIENT)
Age: 56
End: 2021-10-13

## 2021-10-25 ENCOUNTER — APPOINTMENT (OUTPATIENT)
Dept: FAMILY MEDICINE | Facility: CLINIC | Age: 56
End: 2021-10-25
Payer: COMMERCIAL

## 2021-10-25 VITALS
SYSTOLIC BLOOD PRESSURE: 120 MMHG | HEART RATE: 48 BPM | OXYGEN SATURATION: 94 % | WEIGHT: 142 LBS | BODY MASS INDEX: 27.73 KG/M2 | DIASTOLIC BLOOD PRESSURE: 90 MMHG

## 2021-10-25 DIAGNOSIS — R59.0 LOCALIZED ENLARGED LYMPH NODES: ICD-10-CM

## 2021-10-25 PROCEDURE — 99213 OFFICE O/P EST LOW 20 MIN: CPT

## 2021-11-01 ENCOUNTER — APPOINTMENT (OUTPATIENT)
Dept: FAMILY MEDICINE | Facility: CLINIC | Age: 56
End: 2021-11-01

## 2021-12-14 ENCOUNTER — RX RENEWAL (OUTPATIENT)
Age: 56
End: 2021-12-14

## 2022-01-18 ENCOUNTER — RX RENEWAL (OUTPATIENT)
Age: 57
End: 2022-01-18

## 2022-02-11 ENCOUNTER — RX RENEWAL (OUTPATIENT)
Age: 57
End: 2022-02-11

## 2023-06-30 ENCOUNTER — APPOINTMENT (OUTPATIENT)
Dept: FAMILY MEDICINE | Facility: CLINIC | Age: 58
End: 2023-06-30
Payer: COMMERCIAL

## 2023-06-30 VITALS
WEIGHT: 141 LBS | HEART RATE: 68 BPM | SYSTOLIC BLOOD PRESSURE: 124 MMHG | TEMPERATURE: 97.2 F | DIASTOLIC BLOOD PRESSURE: 77 MMHG | HEIGHT: 60 IN | OXYGEN SATURATION: 98 % | BODY MASS INDEX: 27.68 KG/M2

## 2023-06-30 DIAGNOSIS — R09.81 NASAL CONGESTION: ICD-10-CM

## 2023-06-30 DIAGNOSIS — R68.89 OTHER GENERAL SYMPTOMS AND SIGNS: ICD-10-CM

## 2023-06-30 DIAGNOSIS — Z00.00 ENCOUNTER FOR GENERAL ADULT MEDICAL EXAMINATION W/OUT ABNORMAL FINDINGS: ICD-10-CM

## 2023-06-30 PROCEDURE — 36415 COLL VENOUS BLD VENIPUNCTURE: CPT

## 2023-06-30 PROCEDURE — 99214 OFFICE O/P EST MOD 30 MIN: CPT | Mod: 25

## 2023-06-30 RX ORDER — MULTIVITAMIN
TABLET ORAL
Refills: 0 | Status: DISCONTINUED | COMMUNITY
End: 2023-06-30

## 2023-06-30 NOTE — HISTORY OF PRESENT ILLNESS
[FreeTextEntry1] : Here for follow-up; needs med refills.\par  [de-identified] : Here for follow-up; needs med refills.\par \par Colonoscopy 11/22\par Mammo 6/22 \par Gyn Dr. Tyler-2 years ago \par \par Seeing Cardiology next week.  \par -Not taking Lipitor; took for 1 month. \par Has been taking Vit D3 \par Has been taking Metoprolol 25mg \par \par Had COVID in January 2023\par \par No menses since age 55 \par \par Took atorvastatin-for 1 month-felt muscle aches.

## 2023-06-30 NOTE — PLAN
[FreeTextEntry1] : COVID initial series completed. \par Discussed Shingrix eligible.  \par \par Anemia per GI report-was considering for Endoscopy-no follow-up yet.\par \par Has upcoming Cardio appointment.\par Will follow up labwork drawn in office today.\par \par Recommending ENT eval for nasal obstruction symptoms.\par Forgetfulness-encouraged 7+ hours of sleep per night; check B12.  Neuro eval.\par Will adjust meds based on labs. \par Patient expressed understanding of plan.\par

## 2023-06-30 NOTE — REVIEW OF SYSTEMS
[Constipation] : constipation [Negative] : Genitourinary [Fever] : no fever [Chills] : no chills [Nasal Discharge] : no nasal discharge [Sore Throat] : no sore throat [Postnasal Drip] : no postnasal drip [Chest Pain] : no chest pain [Shortness Of Breath] : no shortness of breath [Wheezing] : no wheezing [Cough] : no cough [Nausea] : no nausea [Diarrhea] : diarrhea [Vomiting] : no vomiting [Headache] : no headache [Dizziness] : no dizziness [FreeTextEntry4] : nasal passages feel congested [de-identified] : feels forgetful; sleeping 5 hours a night

## 2023-06-30 NOTE — PHYSICAL EXAM
[No Acute Distress] : no acute distress [Well Developed] : well developed [Normal Oropharynx] : the oropharynx was normal [No Lymphadenopathy] : no lymphadenopathy [No Edema] : there was no peripheral edema [No Extremity Clubbing/Cyanosis] : no extremity clubbing/cyanosis [Alert and Oriented x3] : oriented to person, place, and time [Normal] : affect was normal and insight and judgment were intact [de-identified] : sania mock/l  [de-identified] : [id number]

## 2023-07-03 LAB
25(OH)D3 SERPL-MCNC: 14.4 NG/ML
ALBUMIN SERPL ELPH-MCNC: 4.7 G/DL
ALP BLD-CCNC: 80 U/L
ALT SERPL-CCNC: 23 U/L
ANION GAP SERPL CALC-SCNC: 12 MMOL/L
AST SERPL-CCNC: 21 U/L
BILIRUB SERPL-MCNC: 0.4 MG/DL
BUN SERPL-MCNC: 14 MG/DL
CALCIUM SERPL-MCNC: 10.1 MG/DL
CHLORIDE SERPL-SCNC: 104 MMOL/L
CHOLEST SERPL-MCNC: 254 MG/DL
CO2 SERPL-SCNC: 26 MMOL/L
CREAT SERPL-MCNC: 0.81 MG/DL
EGFR: 84 ML/MIN/1.73M2
ESTIMATED AVERAGE GLUCOSE: 108 MG/DL
FERRITIN SERPL-MCNC: 56 NG/ML
FOLATE SERPL-MCNC: 12.3 NG/ML
GLUCOSE SERPL-MCNC: 80 MG/DL
HBA1C MFR BLD HPLC: 5.4 %
HDLC SERPL-MCNC: 53 MG/DL
IRON SATN MFR SERPL: 22 %
IRON SERPL-MCNC: 69 UG/DL
LDLC SERPL CALC-MCNC: 182 MG/DL
NONHDLC SERPL-MCNC: 201 MG/DL
POTASSIUM SERPL-SCNC: 4.7 MMOL/L
PROT SERPL-MCNC: 8.1 G/DL
SODIUM SERPL-SCNC: 142 MMOL/L
TIBC SERPL-MCNC: 312 UG/DL
TRANSFERRIN SERPL-MCNC: 251 MG/DL
TRIGL SERPL-MCNC: 93 MG/DL
TSH SERPL-ACNC: 2.03 UIU/ML
UIBC SERPL-MCNC: 242 UG/DL
VIT B12 SERPL-MCNC: 553 PG/ML

## 2023-07-03 RX ORDER — ATORVASTATIN CALCIUM 10 MG/1
10 TABLET, FILM COATED ORAL DAILY
Qty: 90 | Refills: 0 | Status: DISCONTINUED | COMMUNITY
Start: 2021-07-06 | End: 2023-07-03

## 2023-07-05 ENCOUNTER — NON-APPOINTMENT (OUTPATIENT)
Age: 58
End: 2023-07-05

## 2023-07-05 ENCOUNTER — APPOINTMENT (OUTPATIENT)
Dept: CARDIOLOGY | Facility: CLINIC | Age: 58
End: 2023-07-05
Payer: COMMERCIAL

## 2023-07-05 VITALS
OXYGEN SATURATION: 98 % | WEIGHT: 144 LBS | HEART RATE: 63 BPM | HEIGHT: 60 IN | BODY MASS INDEX: 28.27 KG/M2 | DIASTOLIC BLOOD PRESSURE: 70 MMHG | SYSTOLIC BLOOD PRESSURE: 120 MMHG

## 2023-07-05 DIAGNOSIS — I47.1 SUPRAVENTRICULAR TACHYCARDIA: ICD-10-CM

## 2023-07-05 DIAGNOSIS — R00.2 PALPITATIONS: ICD-10-CM

## 2023-07-05 PROCEDURE — 99203 OFFICE O/P NEW LOW 30 MIN: CPT | Mod: 25

## 2023-07-05 PROCEDURE — 93306 TTE W/DOPPLER COMPLETE: CPT

## 2023-07-05 RX ORDER — METOPROLOL SUCCINATE 50 MG/1
50 TABLET, EXTENDED RELEASE ORAL
Qty: 90 | Refills: 0 | Status: DISCONTINUED | COMMUNITY
Start: 2019-11-11 | End: 2023-07-05

## 2023-07-17 ENCOUNTER — APPOINTMENT (OUTPATIENT)
Dept: CARDIOLOGY | Facility: CLINIC | Age: 58
End: 2023-07-17
Payer: COMMERCIAL

## 2023-07-17 PROCEDURE — 93246 EXT ECG>7D<15D RECORDING: CPT

## 2023-07-28 ENCOUNTER — APPOINTMENT (OUTPATIENT)
Dept: CARDIOLOGY | Facility: CLINIC | Age: 58
End: 2023-07-28
Payer: COMMERCIAL

## 2023-07-28 PROCEDURE — 93306 TTE W/DOPPLER COMPLETE: CPT

## 2023-08-01 NOTE — HISTORY OF PRESENT ILLNESS
[FreeTextEntry1] : 57-year-old with palpitations and sometimes marginal blood pressure.  No current chest pains, shortness of breath, syncope or edema.  Available medical data reviewed.  History of dyslipidemia and paroxysmal SVT noted as well.

## 2023-08-01 NOTE — DISCUSSION/SUMMARY
[FreeTextEntry1] : Continue metoprolol as directed for heart rate and palpitation management.  Continue pravastatin for lipid lowering.  History as well as dyslipidemia and paroxysmal SVT therefore given symptoms we will also assess cardiac testing.  To further cardiac risk stratify, I have ordered an echocardiogram to assess LV function and valvular status.  I will order a regular treadmill stress test to assess cardiac fitness and rule out any provocable ECG changes as well as check vital assessment to exercise.  7-day Zio patch ordered to rule out dysrhythmias.  We will call the patient with test results and followup with you for general care.  Heart healthy diet and lifestyle encouraged.

## 2023-10-09 ENCOUNTER — RX RENEWAL (OUTPATIENT)
Age: 58
End: 2023-10-09

## 2023-10-17 ENCOUNTER — APPOINTMENT (OUTPATIENT)
Dept: CARDIOLOGY | Facility: CLINIC | Age: 58
End: 2023-10-17
Payer: COMMERCIAL

## 2023-10-17 PROCEDURE — 93015 CV STRESS TEST SUPVJ I&R: CPT

## 2023-11-20 ENCOUNTER — APPOINTMENT (OUTPATIENT)
Dept: FAMILY MEDICINE | Facility: CLINIC | Age: 58
End: 2023-11-20
Payer: COMMERCIAL

## 2023-11-20 VITALS
BODY MASS INDEX: 28.27 KG/M2 | TEMPERATURE: 98.1 F | OXYGEN SATURATION: 99 % | RESPIRATION RATE: 16 BRPM | HEART RATE: 59 BPM | HEIGHT: 60 IN | SYSTOLIC BLOOD PRESSURE: 129 MMHG | DIASTOLIC BLOOD PRESSURE: 82 MMHG | WEIGHT: 144 LBS

## 2023-11-20 DIAGNOSIS — M25.511 PAIN IN RIGHT SHOULDER: ICD-10-CM

## 2023-11-20 DIAGNOSIS — M79.644 PAIN IN RIGHT FINGER(S): ICD-10-CM

## 2023-11-20 PROCEDURE — 99214 OFFICE O/P EST MOD 30 MIN: CPT | Mod: 25

## 2023-11-20 PROCEDURE — 36415 COLL VENOUS BLD VENIPUNCTURE: CPT

## 2023-11-20 RX ORDER — ERGOCALCIFEROL 1.25 MG/1
1.25 MG CAPSULE, LIQUID FILLED ORAL
Qty: 4 | Refills: 1 | Status: DISCONTINUED | COMMUNITY
Start: 2023-07-03 | End: 2023-11-20

## 2023-11-21 DIAGNOSIS — D50.9 IRON DEFICIENCY ANEMIA, UNSPECIFIED: ICD-10-CM

## 2023-11-22 LAB
25(OH)D3 SERPL-MCNC: 16.1 NG/ML
ALBUMIN SERPL ELPH-MCNC: 4.5 G/DL
ALP BLD-CCNC: 79 U/L
ALT SERPL-CCNC: 23 U/L
ANION GAP SERPL CALC-SCNC: 8 MMOL/L
AST SERPL-CCNC: 18 U/L
BASOPHILS # BLD AUTO: 0.03 K/UL
BASOPHILS NFR BLD AUTO: 0.7 %
BILIRUB SERPL-MCNC: 0.3 MG/DL
BUN SERPL-MCNC: 11 MG/DL
CALCIUM SERPL-MCNC: 9.9 MG/DL
CHLORIDE SERPL-SCNC: 101 MMOL/L
CHOLEST SERPL-MCNC: 229 MG/DL
CO2 SERPL-SCNC: 28 MMOL/L
CREAT SERPL-MCNC: 0.72 MG/DL
EGFR: 97 ML/MIN/1.73M2
EOSINOPHIL # BLD AUTO: 0.05 K/UL
EOSINOPHIL NFR BLD AUTO: 1.2 %
ESTIMATED AVERAGE GLUCOSE: 108 MG/DL
FERRITIN SERPL-MCNC: 64 NG/ML
FOLATE SERPL-MCNC: 12.4 NG/ML
GLUCOSE SERPL-MCNC: 79 MG/DL
HBA1C MFR BLD HPLC: 5.4 %
HCT VFR BLD CALC: 35.9 %
HDLC SERPL-MCNC: 51 MG/DL
HGB BLD-MCNC: 11.1 G/DL
IMM GRANULOCYTES NFR BLD AUTO: 0.2 %
IRON SATN MFR SERPL: 26 %
IRON SERPL-MCNC: 83 UG/DL
LDLC SERPL CALC-MCNC: 158 MG/DL
LYMPHOCYTES # BLD AUTO: 2.06 K/UL
LYMPHOCYTES NFR BLD AUTO: 48.5 %
MAN DIFF?: NORMAL
MCHC RBC-ENTMCNC: 28.9 PG
MCHC RBC-ENTMCNC: 30.9 GM/DL
MCV RBC AUTO: 93.5 FL
MONOCYTES # BLD AUTO: 0.4 K/UL
MONOCYTES NFR BLD AUTO: 9.4 %
NEUTROPHILS # BLD AUTO: 1.7 K/UL
NEUTROPHILS NFR BLD AUTO: 40 %
NONHDLC SERPL-MCNC: 179 MG/DL
PLATELET # BLD AUTO: 222 K/UL
POTASSIUM SERPL-SCNC: 3.9 MMOL/L
PROT SERPL-MCNC: 8 G/DL
RBC # BLD: 3.84 M/UL
RBC # FLD: 14.3 %
SODIUM SERPL-SCNC: 136 MMOL/L
TIBC SERPL-MCNC: 319 UG/DL
TRANSFERRIN SERPL-MCNC: 246 MG/DL
TRIGL SERPL-MCNC: 115 MG/DL
TSH SERPL-ACNC: 2.8 UIU/ML
UIBC SERPL-MCNC: 236 UG/DL
VIT B12 SERPL-MCNC: 544 PG/ML
WBC # FLD AUTO: 4.25 K/UL

## 2023-11-22 RX ORDER — UBIDECARENONE/VIT E ACET 100MG-5
50 MCG CAPSULE ORAL
Qty: 90 | Refills: 0 | Status: ACTIVE | COMMUNITY
Start: 2023-11-22 | End: 1900-01-01

## 2023-11-22 RX ORDER — METOPROLOL SUCCINATE 25 MG/1
25 TABLET, EXTENDED RELEASE ORAL
Qty: 90 | Refills: 0 | Status: ACTIVE | COMMUNITY
Start: 2021-03-17 | End: 1900-01-01

## 2024-02-26 ENCOUNTER — APPOINTMENT (OUTPATIENT)
Dept: FAMILY MEDICINE | Facility: CLINIC | Age: 59
End: 2024-02-26
Payer: COMMERCIAL

## 2024-02-26 VITALS
WEIGHT: 144 LBS | DIASTOLIC BLOOD PRESSURE: 78 MMHG | BODY MASS INDEX: 28.27 KG/M2 | SYSTOLIC BLOOD PRESSURE: 131 MMHG | OXYGEN SATURATION: 99 % | TEMPERATURE: 97.7 F | RESPIRATION RATE: 16 BRPM | HEART RATE: 61 BPM | HEIGHT: 60 IN

## 2024-02-26 DIAGNOSIS — D64.9 ANEMIA, UNSPECIFIED: ICD-10-CM

## 2024-02-26 DIAGNOSIS — E78.00 PURE HYPERCHOLESTEROLEMIA, UNSPECIFIED: ICD-10-CM

## 2024-02-26 PROCEDURE — G2211 COMPLEX E/M VISIT ADD ON: CPT

## 2024-02-26 PROCEDURE — 99214 OFFICE O/P EST MOD 30 MIN: CPT

## 2024-02-26 PROCEDURE — 36415 COLL VENOUS BLD VENIPUNCTURE: CPT

## 2024-02-26 NOTE — PHYSICAL EXAM
[No Acute Distress] : no acute distress [Well-Appearing] : well-appearing [Normal Oropharynx] : the oropharynx was normal [No Lymphadenopathy] : no lymphadenopathy [Thyroid Normal, No Nodules] : the thyroid was normal and there were no nodules present [No Edema] : there was no peripheral edema [No Extremity Clubbing/Cyanosis] : no extremity clubbing/cyanosis [Normal] : affect was normal and insight and judgment were intact [Well Developed] : well developed

## 2024-02-26 NOTE — REVIEW OF SYSTEMS
[Negative] : Gastrointestinal [Chills] : no chills [Fever] : no fever [Nasal Discharge] : no nasal discharge [Sore Throat] : no sore throat [Chest Pain] : no chest pain [Wheezing] : no wheezing [Shortness Of Breath] : no shortness of breath [Dysuria] : no dysuria [Cough] : no cough [Headache] : no headache [Hematuria] : no hematuria [Dizziness] : no dizziness [de-identified] : Mood doing okay [FreeTextEntry5] : history of palpitations

## 2024-02-26 NOTE — PLAN
[FreeTextEntry1] : H&H improving last check HBG 11.1 will recheck.  last visit-will recheck-continue current dosage of statin.  Low Vit D-last visit 16.1.  Will recheck.  Will follow up labwork drawn in office today. Will adjust meds based on labs.  Patient expressed understanding of plan.

## 2024-02-26 NOTE — HISTORY OF PRESENT ILLNESS
[de-identified] : Here for follow-up; needs med refills.  Sleeping 7 hours a night-feels focus has been better with more sleep.   Taking 25mg Metoprolol BP at home 120-119  Went through Menopause-55 Colonoscopy Nov 22-Dr. Rodrigues   LDL was 105; total chol ~200 when checked with life insurance.  [FreeTextEntry1] : Here for follow-up; needs med refills.

## 2024-02-28 ENCOUNTER — RX RENEWAL (OUTPATIENT)
Age: 59
End: 2024-02-28

## 2024-02-28 DIAGNOSIS — R79.89 OTHER SPECIFIED ABNORMAL FINDINGS OF BLOOD CHEMISTRY: ICD-10-CM

## 2024-02-28 LAB
25(OH)D3 SERPL-MCNC: 15.4 NG/ML
ALBUMIN SERPL ELPH-MCNC: 4.7 G/DL
ALP BLD-CCNC: 76 U/L
ALT SERPL-CCNC: 18 U/L
ANION GAP SERPL CALC-SCNC: 10 MMOL/L
AST SERPL-CCNC: 19 U/L
BASOPHILS # BLD AUTO: 0.02 K/UL
BASOPHILS NFR BLD AUTO: 0.5 %
BILIRUB SERPL-MCNC: 0.4 MG/DL
BUN SERPL-MCNC: 9 MG/DL
CALCIUM SERPL-MCNC: 9.9 MG/DL
CHLORIDE SERPL-SCNC: 103 MMOL/L
CHOLEST SERPL-MCNC: 203 MG/DL
CO2 SERPL-SCNC: 27 MMOL/L
CREAT SERPL-MCNC: 0.74 MG/DL
EGFR: 93 ML/MIN/1.73M2
EOSINOPHIL # BLD AUTO: 0.05 K/UL
EOSINOPHIL NFR BLD AUTO: 1.3 %
ESTIMATED AVERAGE GLUCOSE: 108 MG/DL
FERRITIN SERPL-MCNC: 65 NG/ML
FOLATE SERPL-MCNC: 11 NG/ML
GLUCOSE SERPL-MCNC: 88 MG/DL
HBA1C MFR BLD HPLC: 5.4 %
HCT VFR BLD CALC: 37 %
HDLC SERPL-MCNC: 50 MG/DL
HGB BLD-MCNC: 11.1 G/DL
IMM GRANULOCYTES NFR BLD AUTO: 0.3 %
IRON SATN MFR SERPL: 29 %
IRON SERPL-MCNC: 91 UG/DL
LDLC SERPL CALC-MCNC: 132 MG/DL
LYMPHOCYTES # BLD AUTO: 1.82 K/UL
LYMPHOCYTES NFR BLD AUTO: 48.8 %
MAN DIFF?: NORMAL
MCHC RBC-ENTMCNC: 28.4 PG
MCHC RBC-ENTMCNC: 30 GM/DL
MCV RBC AUTO: 94.6 FL
MONOCYTES # BLD AUTO: 0.38 K/UL
MONOCYTES NFR BLD AUTO: 10.2 %
NEUTROPHILS # BLD AUTO: 1.45 K/UL
NEUTROPHILS NFR BLD AUTO: 38.9 %
NONHDLC SERPL-MCNC: 154 MG/DL
PLATELET # BLD AUTO: 210 K/UL
POTASSIUM SERPL-SCNC: 4.4 MMOL/L
PROT SERPL-MCNC: 8 G/DL
RBC # BLD: 3.91 M/UL
RBC # FLD: 14.8 %
SODIUM SERPL-SCNC: 141 MMOL/L
TIBC SERPL-MCNC: 314 UG/DL
TRANSFERRIN SERPL-MCNC: 251 MG/DL
TRIGL SERPL-MCNC: 121 MG/DL
TSH SERPL-ACNC: 2.51 UIU/ML
UIBC SERPL-MCNC: 223 UG/DL
VIT B12 SERPL-MCNC: 518 PG/ML
WBC # FLD AUTO: 3.73 K/UL

## 2024-02-28 RX ORDER — ERGOCALCIFEROL 1.25 MG/1
1.25 MG CAPSULE, LIQUID FILLED ORAL
Qty: 4 | Refills: 1 | Status: ACTIVE | COMMUNITY
Start: 2024-02-28 | End: 1900-01-01

## 2024-02-28 RX ORDER — PRAVASTATIN SODIUM 40 MG/1
40 TABLET ORAL
Qty: 90 | Refills: 0 | Status: ACTIVE | COMMUNITY
Start: 2023-07-03 | End: 1900-01-01

## 2024-03-01 ENCOUNTER — TRANSCRIPTION ENCOUNTER (OUTPATIENT)
Age: 59
End: 2024-03-01

## 2024-03-04 ENCOUNTER — TRANSCRIPTION ENCOUNTER (OUTPATIENT)
Age: 59
End: 2024-03-04

## 2024-07-11 ENCOUNTER — APPOINTMENT (OUTPATIENT)
Dept: FAMILY MEDICINE | Facility: CLINIC | Age: 59
End: 2024-07-11
Payer: COMMERCIAL

## 2024-07-11 VITALS
HEART RATE: 68 BPM | HEIGHT: 60 IN | RESPIRATION RATE: 16 BRPM | SYSTOLIC BLOOD PRESSURE: 124 MMHG | TEMPERATURE: 98.1 F | BODY MASS INDEX: 28.27 KG/M2 | OXYGEN SATURATION: 98 % | WEIGHT: 144 LBS | DIASTOLIC BLOOD PRESSURE: 73 MMHG

## 2024-07-11 DIAGNOSIS — Z00.00 ENCOUNTER FOR GENERAL ADULT MEDICAL EXAMINATION W/OUT ABNORMAL FINDINGS: ICD-10-CM

## 2024-07-11 DIAGNOSIS — R79.89 OTHER SPECIFIED ABNORMAL FINDINGS OF BLOOD CHEMISTRY: ICD-10-CM

## 2024-07-11 DIAGNOSIS — D64.9 ANEMIA, UNSPECIFIED: ICD-10-CM

## 2024-07-11 DIAGNOSIS — E78.00 PURE HYPERCHOLESTEROLEMIA, UNSPECIFIED: ICD-10-CM

## 2024-07-11 PROCEDURE — 36415 COLL VENOUS BLD VENIPUNCTURE: CPT

## 2024-07-11 PROCEDURE — G2211 COMPLEX E/M VISIT ADD ON: CPT

## 2024-07-11 PROCEDURE — 99214 OFFICE O/P EST MOD 30 MIN: CPT

## 2024-07-12 LAB
25(OH)D3 SERPL-MCNC: 23.5 NG/ML
ALBUMIN SERPL ELPH-MCNC: 4.5 G/DL
ALP BLD-CCNC: 70 U/L
ALT SERPL-CCNC: 19 U/L
ANION GAP SERPL CALC-SCNC: 13 MMOL/L
AST SERPL-CCNC: 20 U/L
BASOPHILS # BLD AUTO: 0.03 K/UL
BASOPHILS NFR BLD AUTO: 0.8 %
BILIRUB SERPL-MCNC: 0.4 MG/DL
BUN SERPL-MCNC: 8 MG/DL
CALCIUM SERPL-MCNC: 9.9 MG/DL
CHLORIDE SERPL-SCNC: 103 MMOL/L
CHOLEST SERPL-MCNC: 176 MG/DL
CO2 SERPL-SCNC: 26 MMOL/L
CREAT SERPL-MCNC: 0.81 MG/DL
EGFR: 84 ML/MIN/1.73M2
EOSINOPHIL # BLD AUTO: 0.1 K/UL
EOSINOPHIL NFR BLD AUTO: 2.6 %
ESTIMATED AVERAGE GLUCOSE: 108 MG/DL
FERRITIN SERPL-MCNC: 69 NG/ML
FOLATE SERPL-MCNC: 11.6 NG/ML
GLUCOSE SERPL-MCNC: 66 MG/DL
HBA1C MFR BLD HPLC: 5.4 %
HCT VFR BLD CALC: 36 %
HDLC SERPL-MCNC: 46 MG/DL
HGB BLD-MCNC: 11 G/DL
IMM GRANULOCYTES NFR BLD AUTO: 0.3 %
IRON SATN MFR SERPL: 27 %
IRON SERPL-MCNC: 80 UG/DL
LDLC SERPL CALC-MCNC: 111 MG/DL
LYMPHOCYTES NFR BLD AUTO: 43.9 %
MAN DIFF?: NORMAL
MCHC RBC-ENTMCNC: 28.9 PG
MCHC RBC-ENTMCNC: 30.6 GM/DL
MONOCYTES # BLD AUTO: 0.43 K/UL
MONOCYTES NFR BLD AUTO: 11.2 %
NEUTROPHILS # BLD AUTO: 1.58 K/UL
NONHDLC SERPL-MCNC: 130 MG/DL
PLATELET # BLD AUTO: 171 K/UL
POTASSIUM SERPL-SCNC: 4.3 MMOL/L
PROT SERPL-MCNC: 8.1 G/DL
RBC # BLD: 3.81 M/UL
RBC # FLD: 15 %
SODIUM SERPL-SCNC: 143 MMOL/L
TIBC SERPL-MCNC: 295 UG/DL
TRANSFERRIN SERPL-MCNC: 236 MG/DL
TRIGL SERPL-MCNC: 108 MG/DL
TSH SERPL-ACNC: 1.63 UIU/ML
UIBC SERPL-MCNC: 214 UG/DL
VIT B12 SERPL-MCNC: 598 PG/ML
WBC # FLD AUTO: 3.83 K/UL

## 2024-12-20 NOTE — REASON FOR VISIT
Esophageal obstruction due to food impaction [Follow-Up - Clinic] : a clinic follow-up of [Hypertension] : hypertension [Palpitations] : palpitations

## 2024-12-24 PROBLEM — F10.90 ALCOHOL USE: Status: ACTIVE | Noted: 2019-06-24

## 2025-02-03 ENCOUNTER — RX RENEWAL (OUTPATIENT)
Age: 60
End: 2025-02-03

## 2025-05-02 ENCOUNTER — NON-APPOINTMENT (OUTPATIENT)
Age: 60
End: 2025-05-02

## 2025-05-02 ENCOUNTER — LABORATORY RESULT (OUTPATIENT)
Age: 60
End: 2025-05-02

## 2025-05-02 ENCOUNTER — APPOINTMENT (OUTPATIENT)
Dept: FAMILY MEDICINE | Facility: CLINIC | Age: 60
End: 2025-05-02
Payer: COMMERCIAL

## 2025-05-02 VITALS
SYSTOLIC BLOOD PRESSURE: 128 MMHG | BODY MASS INDEX: 27.29 KG/M2 | TEMPERATURE: 97.7 F | OXYGEN SATURATION: 98 % | DIASTOLIC BLOOD PRESSURE: 81 MMHG | HEART RATE: 51 BPM | WEIGHT: 139 LBS | HEIGHT: 60 IN | RESPIRATION RATE: 16 BRPM

## 2025-05-02 DIAGNOSIS — D64.9 ANEMIA, UNSPECIFIED: ICD-10-CM

## 2025-05-02 DIAGNOSIS — R79.89 OTHER SPECIFIED ABNORMAL FINDINGS OF BLOOD CHEMISTRY: ICD-10-CM

## 2025-05-02 DIAGNOSIS — E78.00 PURE HYPERCHOLESTEROLEMIA, UNSPECIFIED: ICD-10-CM

## 2025-05-02 DIAGNOSIS — R00.2 PALPITATIONS: ICD-10-CM

## 2025-05-02 DIAGNOSIS — M25.512 PAIN IN LEFT SHOULDER: ICD-10-CM

## 2025-05-02 PROCEDURE — 93000 ELECTROCARDIOGRAM COMPLETE: CPT

## 2025-05-02 PROCEDURE — 99214 OFFICE O/P EST MOD 30 MIN: CPT

## 2025-05-02 PROCEDURE — 36415 COLL VENOUS BLD VENIPUNCTURE: CPT

## 2025-05-05 LAB
25(OH)D3 SERPL-MCNC: 17.2 NG/ML
ALBUMIN SERPL ELPH-MCNC: 4.7 G/DL
ALP BLD-CCNC: 63 U/L
ALT SERPL-CCNC: 21 U/L
ANION GAP SERPL CALC-SCNC: 12 MMOL/L
APPEARANCE: CLEAR
AST SERPL-CCNC: 21 U/L
BASOPHILS # BLD AUTO: 0.04 K/UL
BASOPHILS NFR BLD AUTO: 0.9 %
BILIRUB SERPL-MCNC: 0.4 MG/DL
BILIRUBIN URINE: NEGATIVE
BLOOD URINE: NEGATIVE
BUN SERPL-MCNC: 11 MG/DL
CALCIUM SERPL-MCNC: 10 MG/DL
CHLORIDE SERPL-SCNC: 105 MMOL/L
CHOLEST SERPL-MCNC: 175 MG/DL
CO2 SERPL-SCNC: 25 MMOL/L
COLOR: YELLOW
CREAT SERPL-MCNC: 0.79 MG/DL
EGFRCR SERPLBLD CKD-EPI 2021: 86 ML/MIN/1.73M2
EOSINOPHIL # BLD AUTO: 0.04 K/UL
EOSINOPHIL NFR BLD AUTO: 0.9 %
ESTIMATED AVERAGE GLUCOSE: 108 MG/DL
FERRITIN SERPL-MCNC: 68 NG/ML
GLUCOSE QUALITATIVE U: NEGATIVE MG/DL
GLUCOSE SERPL-MCNC: 83 MG/DL
HBA1C MFR BLD HPLC: 5.4 %
HCT VFR BLD CALC: 37.2 %
HDLC SERPL-MCNC: 55 MG/DL
HGB BLD-MCNC: 11.7 G/DL
IMM GRANULOCYTES NFR BLD AUTO: 0.2 %
IRON SATN MFR SERPL: 26 %
IRON SERPL-MCNC: 82 UG/DL
KETONES URINE: ABNORMAL MG/DL
LDLC SERPL-MCNC: 103 MG/DL
LEUKOCYTE ESTERASE URINE: ABNORMAL
LYMPHOCYTES # BLD AUTO: 2.07 K/UL
LYMPHOCYTES NFR BLD AUTO: 48.8 %
MAN DIFF?: NORMAL
MCHC RBC-ENTMCNC: 28.8 PG
MCHC RBC-ENTMCNC: 31.5 G/DL
MCV RBC AUTO: 91.6 FL
MONOCYTES # BLD AUTO: 0.43 K/UL
MONOCYTES NFR BLD AUTO: 10.1 %
NEUTROPHILS # BLD AUTO: 1.65 K/UL
NEUTROPHILS NFR BLD AUTO: 39.1 %
NITRITE URINE: NEGATIVE
NONHDLC SERPL-MCNC: 120 MG/DL
PH URINE: 5.5
PLATELET # BLD AUTO: 196 K/UL
POTASSIUM SERPL-SCNC: 3.8 MMOL/L
PROT SERPL-MCNC: 8.4 G/DL
PROTEIN URINE: NEGATIVE MG/DL
RBC # BLD: 4.06 M/UL
RBC # FLD: 14.6 %
SODIUM SERPL-SCNC: 142 MMOL/L
SPECIFIC GRAVITY URINE: 1.03
TIBC SERPL-MCNC: 315 UG/DL
TRANSFERRIN SERPL-MCNC: 239 MG/DL
TRIGL SERPL-MCNC: 90 MG/DL
TSH SERPL-ACNC: 1.43 UIU/ML
UIBC SERPL-MCNC: 232 UG/DL
UROBILINOGEN URINE: 0.2 MG/DL
WBC # FLD AUTO: 4.24 K/UL